# Patient Record
Sex: MALE | Race: WHITE | NOT HISPANIC OR LATINO | Employment: FULL TIME | URBAN - NONMETROPOLITAN AREA
[De-identification: names, ages, dates, MRNs, and addresses within clinical notes are randomized per-mention and may not be internally consistent; named-entity substitution may affect disease eponyms.]

---

## 2023-03-21 LAB
ALANINE AMINOTRANSFERASE (SGPT) (U/L) IN SER/PLAS: 19 U/L (ref 10–52)
ALBUMIN (G/DL) IN SER/PLAS: 4.1 G/DL (ref 3.4–5)
ALKALINE PHOSPHATASE (U/L) IN SER/PLAS: 61 U/L (ref 33–120)
ANION GAP IN SER/PLAS: 9 MMOL/L (ref 10–20)
ASPARTATE AMINOTRANSFERASE (SGOT) (U/L) IN SER/PLAS: 19 U/L (ref 9–39)
BILIRUBIN TOTAL (MG/DL) IN SER/PLAS: 0.7 MG/DL (ref 0–1.2)
CALCIUM (MG/DL) IN SER/PLAS: 9.2 MG/DL (ref 8.6–10.3)
CARBON DIOXIDE, TOTAL (MMOL/L) IN SER/PLAS: 31 MMOL/L (ref 21–32)
CHLORIDE (MMOL/L) IN SER/PLAS: 103 MMOL/L (ref 98–107)
CHOLESTEROL (MG/DL) IN SER/PLAS: 202 MG/DL (ref 0–199)
CHOLESTEROL IN HDL (MG/DL) IN SER/PLAS: 74 MG/DL
CHOLESTEROL/HDL RATIO: 2.7
CREATININE (MG/DL) IN SER/PLAS: 0.82 MG/DL (ref 0.5–1.3)
GFR MALE: >90 ML/MIN/1.73M2
GLUCOSE (MG/DL) IN SER/PLAS: 112 MG/DL (ref 74–99)
LDL: 105 MG/DL (ref 0–99)
POTASSIUM (MMOL/L) IN SER/PLAS: 3.9 MMOL/L (ref 3.5–5.3)
PROSTATE SPECIFIC ANTIGEN,SCREEN: 0.78 NG/ML (ref 0–4)
PROTEIN TOTAL: 7 G/DL (ref 6.4–8.2)
SODIUM (MMOL/L) IN SER/PLAS: 139 MMOL/L (ref 136–145)
TRIGLYCERIDE (MG/DL) IN SER/PLAS: 114 MG/DL (ref 0–149)
UREA NITROGEN (MG/DL) IN SER/PLAS: 12 MG/DL (ref 6–23)
VLDL: 23 MG/DL (ref 0–40)

## 2023-07-25 ENCOUNTER — APPOINTMENT (OUTPATIENT)
Dept: PRIMARY CARE | Facility: CLINIC | Age: 53
End: 2023-07-25
Payer: COMMERCIAL

## 2023-07-25 ENCOUNTER — TELEPHONE (OUTPATIENT)
Dept: PRIMARY CARE | Facility: CLINIC | Age: 53
End: 2023-07-25

## 2023-07-25 ENCOUNTER — OFFICE VISIT (OUTPATIENT)
Dept: PRIMARY CARE | Facility: CLINIC | Age: 53
End: 2023-07-25
Payer: COMMERCIAL

## 2023-07-25 VITALS
OXYGEN SATURATION: 96 % | DIASTOLIC BLOOD PRESSURE: 60 MMHG | WEIGHT: 217.3 LBS | HEIGHT: 73 IN | BODY MASS INDEX: 28.8 KG/M2 | SYSTOLIC BLOOD PRESSURE: 116 MMHG | HEART RATE: 90 BPM

## 2023-07-25 DIAGNOSIS — K21.9 GASTROESOPHAGEAL REFLUX DISEASE WITHOUT ESOPHAGITIS: Primary | ICD-10-CM

## 2023-07-25 DIAGNOSIS — Z01.818 PREOP EXAMINATION: ICD-10-CM

## 2023-07-25 DIAGNOSIS — G47.33 OBSTRUCTIVE SLEEP APNEA SYNDROME: ICD-10-CM

## 2023-07-25 PROCEDURE — 4004F PT TOBACCO SCREEN RCVD TLK: CPT | Performed by: FAMILY MEDICINE

## 2023-07-25 PROCEDURE — 99213 OFFICE O/P EST LOW 20 MIN: CPT | Performed by: FAMILY MEDICINE

## 2023-07-25 RX ORDER — ALBUTEROL SULFATE 90 UG/1
AEROSOL, METERED RESPIRATORY (INHALATION)
COMMUNITY
End: 2024-05-02

## 2023-07-25 RX ORDER — OMEPRAZOLE 20 MG/1
20 CAPSULE, DELAYED RELEASE ORAL
COMMUNITY

## 2023-07-25 ASSESSMENT — ENCOUNTER SYMPTOMS
LIGHT-HEADEDNESS: 0
TROUBLE SWALLOWING: 0
FATIGUE: 0
RHINORRHEA: 0
WHEEZING: 0
APPETITE CHANGE: 0
ADENOPATHY: 0
UNEXPECTED WEIGHT CHANGE: 0
ABDOMINAL PAIN: 0
NAUSEA: 0
DIZZINESS: 0
NUMBNESS: 0
SHORTNESS OF BREATH: 0
ACTIVITY CHANGE: 0
ABDOMINAL DISTENTION: 0
DIARRHEA: 0
ARTHRALGIAS: 0
VOMITING: 0
CONSTIPATION: 0
PALPITATIONS: 0
DIFFICULTY URINATING: 0
COUGH: 0
HEADACHES: 0

## 2023-07-25 NOTE — ASSESSMENT & PLAN NOTE
Patient has been using their CPAP nightly and is experiencing restful sleep, no morning headaches, no witnessed snoring, and notices a difference if they do not use the device.

## 2023-07-25 NOTE — TELEPHONE ENCOUNTER
----- Message from Rafa Santos MD sent at 7/25/2023  2:37 PM EDT -----  Regarding: Preop clearance  Please send a copy of today's office note to the patient's surgeon for preoperative clearance.

## 2023-07-25 NOTE — PROGRESS NOTES
"Subjective   Patient ID: Shiv Luciano is a 52 y.o. male who presents for Pre-op Clearance (Carpal tunnel).    HPI   No headache, chest pain, shortness of breath, dizziness, lightheadedness, or edema  To have CTS on left hand, to have surgery 8/10, to have blood testing and EKG tomorrow  Occ MDI use, wanting to quit smoking  Patient has been using their CPAP nightly and is experiencing restful sleep, no morning headaches, no witnessed snoring, and notices a difference if they do not use the device.      Review of Systems   Constitutional:  Negative for activity change, appetite change, fatigue and unexpected weight change.   HENT:  Negative for ear pain, nosebleeds, rhinorrhea, sneezing and trouble swallowing.    Respiratory:  Negative for cough, shortness of breath and wheezing.    Cardiovascular:  Negative for chest pain, palpitations and leg swelling.   Gastrointestinal:  Negative for abdominal distention, abdominal pain, constipation, diarrhea, nausea and vomiting.   Genitourinary:  Negative for difficulty urinating.   Musculoskeletal:  Negative for arthralgias.   Skin:  Negative for rash.   Neurological:  Negative for dizziness, light-headedness, numbness and headaches.   Hematological:  Negative for adenopathy.   Psychiatric/Behavioral:  Negative for behavioral problems.    All other systems reviewed and are negative.      Objective   /60   Pulse 90   Ht 1.854 m (6' 1\")   Wt 98.6 kg (217 lb 4.8 oz)   SpO2 96%   BMI 28.67 kg/m²     Physical Exam  Vitals and nursing note reviewed.   Constitutional:       Appearance: Normal appearance.   HENT:      Head: Normocephalic and atraumatic.      Right Ear: Tympanic membrane, ear canal and external ear normal.      Left Ear: Tympanic membrane, ear canal and external ear normal.      Nose: Nose normal.      Mouth/Throat:      Mouth: Mucous membranes are moist.      Pharynx: Oropharynx is clear.   Cardiovascular:      Rate and Rhythm: Normal rate and regular " rhythm.      Pulses: Normal pulses.      Heart sounds: Normal heart sounds.   Pulmonary:      Effort: Pulmonary effort is normal.      Breath sounds: Normal breath sounds.   Musculoskeletal:      Cervical back: Normal range of motion and neck supple.   Neurological:      Mental Status: He is alert.   Psychiatric:         Mood and Affect: Mood normal.         Behavior: Behavior normal.         Assessment/Plan   Problem List Items Addressed This Visit       Gastroesophageal reflux disease - Primary     Stable with current treatment.         Obstructive sleep apnea syndrome     Patient has been using their CPAP nightly and is experiencing restful sleep, no morning headaches, no witnessed snoring, and notices a difference if they do not use the device.            Other Visit Diagnoses       Preop examination        To have blood testing EKG done tomorrow at [facility], low risk for planned surgery for carpal tunnel, medically acceptable.

## 2023-09-29 ENCOUNTER — OFFICE VISIT (OUTPATIENT)
Dept: PRIMARY CARE | Facility: CLINIC | Age: 53
End: 2023-09-29
Payer: COMMERCIAL

## 2023-09-29 VITALS
HEART RATE: 92 BPM | HEIGHT: 73 IN | OXYGEN SATURATION: 96 % | BODY MASS INDEX: 29.29 KG/M2 | WEIGHT: 221 LBS | SYSTOLIC BLOOD PRESSURE: 120 MMHG | DIASTOLIC BLOOD PRESSURE: 80 MMHG

## 2023-09-29 DIAGNOSIS — K21.9 GASTROESOPHAGEAL REFLUX DISEASE WITHOUT ESOPHAGITIS: Primary | ICD-10-CM

## 2023-09-29 DIAGNOSIS — G47.33 OBSTRUCTIVE SLEEP APNEA SYNDROME: ICD-10-CM

## 2023-09-29 DIAGNOSIS — Z01.818 PREOP EXAMINATION: ICD-10-CM

## 2023-09-29 PROBLEM — G56.02 CARPAL TUNNEL SYNDROME OF LEFT WRIST: Status: ACTIVE | Noted: 2023-09-29

## 2023-09-29 PROCEDURE — 4004F PT TOBACCO SCREEN RCVD TLK: CPT | Performed by: FAMILY MEDICINE

## 2023-09-29 PROCEDURE — 99213 OFFICE O/P EST LOW 20 MIN: CPT | Performed by: FAMILY MEDICINE

## 2023-09-29 ASSESSMENT — ENCOUNTER SYMPTOMS
NUMBNESS: 0
DIZZINESS: 0
PALPITATIONS: 0
APPETITE CHANGE: 0
WHEEZING: 0
CONSTIPATION: 0
ACTIVITY CHANGE: 0
ADENOPATHY: 0
ABDOMINAL PAIN: 0
FATIGUE: 0
RHINORRHEA: 0
HEADACHES: 0
LIGHT-HEADEDNESS: 0
VOMITING: 0
UNEXPECTED WEIGHT CHANGE: 0
COUGH: 0
SHORTNESS OF BREATH: 0
DIARRHEA: 0
TROUBLE SWALLOWING: 0
NAUSEA: 0
DIFFICULTY URINATING: 0
ARTHRALGIAS: 0
ABDOMINAL DISTENTION: 0

## 2023-09-29 NOTE — PROGRESS NOTES
"Subjective   Patient ID: Shiv Luciano is a 52 y.o. male who presents for Pre-op Exam.    HPI   No headache, chest pain, shortness of breath, dizziness, lightheadedness, or edema  To have left CTR done 10/2, had done before and is having issues again  Able to exercise 4 mets  Smoking less than a pack a day  No GI issues, no issues with prior surgeries    Review of Systems   Constitutional:  Negative for activity change, appetite change, fatigue and unexpected weight change.   HENT:  Negative for ear pain, nosebleeds, rhinorrhea, sneezing and trouble swallowing.    Respiratory:  Negative for cough, shortness of breath and wheezing.    Cardiovascular:  Negative for chest pain, palpitations and leg swelling.   Gastrointestinal:  Negative for abdominal distention, abdominal pain, constipation, diarrhea, nausea and vomiting.   Genitourinary:  Negative for difficulty urinating.   Musculoskeletal:  Negative for arthralgias.   Skin:  Negative for rash.   Neurological:  Negative for dizziness, light-headedness, numbness and headaches.   Hematological:  Negative for adenopathy.   Psychiatric/Behavioral:  Negative for behavioral problems.    All other systems reviewed and are negative.      Objective   /80   Pulse 92   Ht 1.854 m (6' 1\")   Wt 100 kg (221 lb)   SpO2 96%   BMI 29.16 kg/m²     Physical Exam  Vitals and nursing note reviewed.   Constitutional:       Appearance: Normal appearance.   HENT:      Head: Normocephalic and atraumatic.      Right Ear: Tympanic membrane, ear canal and external ear normal.      Left Ear: Tympanic membrane, ear canal and external ear normal.      Nose: Nose normal.      Mouth/Throat:      Mouth: Mucous membranes are moist.      Pharynx: Oropharynx is clear.   Cardiovascular:      Rate and Rhythm: Normal rate and regular rhythm.      Pulses: Normal pulses.      Heart sounds: Normal heart sounds.   Pulmonary:      Effort: Pulmonary effort is normal.      Breath sounds: Normal breath " sounds.   Musculoskeletal:      Cervical back: Normal range of motion and neck supple.   Neurological:      Mental Status: He is alert.   Psychiatric:         Mood and Affect: Mood normal.         Behavior: Behavior normal.         Assessment/Plan   Problem List Items Addressed This Visit             ICD-10-CM    Gastroesophageal reflux disease - Primary K21.9     Stable with PPI no change.         Obstructive sleep apnea syndrome G47.33     Patient has been using their CPAP nightly and is experiencing restful sleep, no morning headaches, no witnessed snoring, and notices a difference if they do not use the device.            Other Visit Diagnoses         Codes    Preop examination     Z01.818    Medically acceptable for low risk surgery.

## 2023-11-22 ENCOUNTER — TELEPHONE (OUTPATIENT)
Dept: PRIMARY CARE | Facility: CLINIC | Age: 53
End: 2023-11-22
Payer: COMMERCIAL

## 2023-11-22 DIAGNOSIS — Z12.11 ENCOUNTER FOR SCREENING FOR MALIGNANT NEOPLASM OF COLON: ICD-10-CM

## 2023-12-08 LAB — NONINV COLON CA DNA+OCC BLD SCRN STL QL: NEGATIVE

## 2024-03-24 ENCOUNTER — TELEPHONE (OUTPATIENT)
Dept: PRIMARY CARE | Facility: CLINIC | Age: 54
End: 2024-03-24
Payer: COMMERCIAL

## 2024-03-24 NOTE — PROGRESS NOTES
Patient called after hours with an extreme sore throat, fevers, and chills, difficulty swallowing for about three days. Prescription for azithromycin sent to [facility] pharmacy for the patient, advise that over-the-counter medication, contact the office, if symptoms worsen.

## 2024-04-30 DIAGNOSIS — R05.9 COUGH, UNSPECIFIED: ICD-10-CM

## 2024-05-02 RX ORDER — ALBUTEROL SULFATE 90 UG/1
2 AEROSOL, METERED RESPIRATORY (INHALATION) EVERY 4 HOURS PRN
Qty: 54 G | Refills: 3 | Status: SHIPPED | OUTPATIENT
Start: 2024-05-02

## 2024-09-24 ENCOUNTER — OFFICE VISIT (OUTPATIENT)
Age: 54
End: 2024-09-24
Payer: COMMERCIAL

## 2024-09-24 VITALS
HEIGHT: 73 IN | WEIGHT: 233.6 LBS | SYSTOLIC BLOOD PRESSURE: 136 MMHG | HEART RATE: 98 BPM | OXYGEN SATURATION: 95 % | BODY MASS INDEX: 30.96 KG/M2 | DIASTOLIC BLOOD PRESSURE: 100 MMHG

## 2024-09-24 DIAGNOSIS — K64.4 EXTERNAL HEMORRHOID: Primary | ICD-10-CM

## 2024-09-24 PROCEDURE — 99213 OFFICE O/P EST LOW 20 MIN: CPT | Performed by: FAMILY MEDICINE

## 2024-09-24 PROCEDURE — 4004F PT TOBACCO SCREEN RCVD TLK: CPT | Performed by: FAMILY MEDICINE

## 2024-09-24 PROCEDURE — 3008F BODY MASS INDEX DOCD: CPT | Performed by: FAMILY MEDICINE

## 2024-09-24 ASSESSMENT — ENCOUNTER SYMPTOMS
ABDOMINAL PAIN: 0
CHEST TIGHTNESS: 0
NAUSEA: 0
COUGH: 0
ACTIVITY CHANGE: 0
FATIGUE: 0
SHORTNESS OF BREATH: 0
VOMITING: 0
CONSTIPATION: 0
DIARRHEA: 0
PALPITATIONS: 0
APPETITE CHANGE: 0
RECTAL PAIN: 1

## 2024-09-24 NOTE — PROGRESS NOTES
"Subjective   Patient ID: Shiv Luciano is a 53 y.o. male who presents for Hemorrhoids (Wart on Lt index finger).    HPI   Irritated hemeroid for 6 months, some leakage and seeping, some blood, had a rupture this past weekend, no pain, + soreness and itching, no constipation or straining      Review of Systems   Constitutional:  Negative for activity change, appetite change and fatigue.   Respiratory:  Negative for cough, chest tightness and shortness of breath.    Cardiovascular:  Negative for chest pain, palpitations and leg swelling.   Gastrointestinal:  Positive for rectal pain. Negative for abdominal pain, constipation, diarrhea, nausea and vomiting.       Objective   BP (!) 136/100   Pulse 98   Ht 1.854 m (6' 1\")   Wt 106 kg (233 lb 9.6 oz)   SpO2 95%   BMI 30.82 kg/m²     Physical Exam  Vitals and nursing note reviewed.   Constitutional:       Appearance: Normal appearance.   HENT:      Head: Normocephalic and atraumatic.      Right Ear: Tympanic membrane, ear canal and external ear normal.      Left Ear: Tympanic membrane, ear canal and external ear normal.      Nose: Nose normal.      Mouth/Throat:      Mouth: Mucous membranes are moist.      Pharynx: Oropharynx is clear.   Cardiovascular:      Rate and Rhythm: Normal rate and regular rhythm.      Pulses: Normal pulses.      Heart sounds: Normal heart sounds.   Pulmonary:      Effort: Pulmonary effort is normal.      Breath sounds: Normal breath sounds.   Genitourinary:     Comments: Rectal examination reveals a nonthrombosed external hemorrhoid.  Minimal erythema, no active bleeding.  Musculoskeletal:      Cervical back: Normal range of motion and neck supple.   Neurological:      Mental Status: He is alert.   Psychiatric:         Mood and Affect: Mood normal.         Behavior: Behavior normal.         Assessment/Plan   Problem List Items Addressed This Visit    None  Visit Diagnoses         Codes    External hemorrhoid    -  Primary K64.4    Recurrent " problem, advised about fiber, refer to general surgery for further evaluation.     Relevant Orders    Referral to General Surgery

## 2024-10-07 ENCOUNTER — APPOINTMENT (OUTPATIENT)
Dept: SURGERY | Facility: CLINIC | Age: 54
End: 2024-10-07
Payer: COMMERCIAL

## 2024-10-07 VITALS
SYSTOLIC BLOOD PRESSURE: 146 MMHG | BODY MASS INDEX: 31.57 KG/M2 | HEART RATE: 94 BPM | DIASTOLIC BLOOD PRESSURE: 86 MMHG | WEIGHT: 238.2 LBS | HEIGHT: 73 IN

## 2024-10-07 DIAGNOSIS — K64.4 EXTERNAL HEMORRHOID: ICD-10-CM

## 2024-10-07 PROCEDURE — 4004F PT TOBACCO SCREEN RCVD TLK: CPT | Performed by: SURGERY

## 2024-10-07 PROCEDURE — 3008F BODY MASS INDEX DOCD: CPT | Performed by: SURGERY

## 2024-10-07 PROCEDURE — 99203 OFFICE O/P NEW LOW 30 MIN: CPT | Performed by: SURGERY

## 2024-10-07 NOTE — LETTER
2024     Rafa Santos MD  [address] [address]    Patient: Shiv Luciano   YOB: 1970   Date of Visit: 10/7/2024       Dear Dr. Rafa Santos MD:    Thank you for referring Shiv Luciano to me for evaluation. Below are my notes for this consultation.  If you have questions, please do not hesitate to call me. I look forward to following your patient along with you.       Sincerely,     Cathie Tee MD      CC: No Recipients  ______________________________________________________________________________________    General Surgery Consultation    Patient: Shiv Luciano  : 1970  MRN: [id number]  Date of Consultation: 10/07/24    Referring Primary Care Provider: Rafa Santos MD    Chief Complaint: Perianal pain, itching    History of Present Illness: Shiv Luciano is a 53 y.o. old male seen at the request of Dr. Santos for evaluation of an external hemorrhoid.  He reports having an external hemorrhoid that flares up about once per year.  He reports that it swells to what feels like the size of the tip of his finger.  It subsequently drains blood clot and bright blood.  He uses wipes to help keep this area clean.  He has perianal itching associated with this.  He has bowel movements 2-3 times per day.  He reports that he is on the toilet for roughly 3 minutes with each bowel movement.  He denies straining.  He does not take any stool softeners, fiber supplements, or laxatives.  Aside from when he has these external hemorrhoid flareups, he denies seeing any blood per rectum. He has never had a colonoscopy.  He has no family history of colon or rectal cancer or inflammatory bowel disease.  He had a negative Cologuard test in 2023.    Medical History:  GERD  Sleep apnea, uses a CPAP at nighttime  BMI 30  Cervical disc disorder with radiculopathy    Surgical History:  Carpal tunnel release, left  Knee arthroscopy  Spinal fusion, 2019  Cervical  "disc replacement, 2022  Epidural steroid injection    Home Medications:  Prior to Admission medications    Medication Sig Start Date End Date Taking? Authorizing Provider   albuterol 90 mcg/actuation inhaler Inhale 2 puffs every 4 hours if needed for wheezing. 24   Rafa Santos MD   omeprazole (PriLOSEC) 20 mg DR capsule Take 1 capsule (20 mg) by mouth once daily.    Historical Provider, MD     Allergies:  Penicillins    Family History:   No family history of colon or rectal cancer or inflammatory bowel disease.  Mother with heart disease    Social History:  Smokes a pack of cigarettes daily, uses a nicotine patch.  Drinks beer weekly.  No drug use.    ROS:  Constitutional:  no fever, sweats, and chills  Cardiovascular: No chest pain  Respiratory: + Sleep apnea  Gastrointestinal: + Intermittent perianal swelling/pain/drainage of blood clot and bright red blood  Genitourinary: no dysuria  Musculoskeletal: no weakness or swelling  Integumentary: no rashes  Neurological: no confusion  Endocrine: no heat or cold intolerance  Heme/Lymph: no easy bruising or bleeding    Objective:  /86   Pulse 94   Ht 1.854 m (6' 1\")   Wt 108 kg (238 lb 3.2 oz)   BMI 31.43 kg/m²     Physical Exam:  Constitutional: No acute distress, conversant, pleasant  Neurologic: alert and oriented  Psych: appropriate affect  Ears, Nose, Mouth and Throat: mucus membranes moist  Pulmonary: No labored breathing  Cardiovascular: Regular rate and rhythm  Abdomen: Nondistended, BMI 31  Rectal: In the right posterior location he has 2 pinpoint scars consistent of the location where he reports having a swollen hemorrhoid.  He does not have any external hemorrhoidal skin tags.  He has increased tone on digital rectal exam and a deep paras cleft.  Musculoskeletal: Moves all extremities, no edema  Skin: no jaundice    Procedure:  With the assistance of my medical assistant, Hiro, in the prone jackknife position, I attempted to " perform lighted anoscopy.  However, the patient was unable to relax for this exam and the anus would not accommodate the scope.  Therefore, this procedure was aborted.    Labs/Imaging:   No recent/pertinent labs or imaging available for review.    Assessment and Plan: Shiv Luciano is a 53 y.o. old male with clinical history of what sounds like a thrombosed external hemorrhoid.  However, on examination he does not have any significant external hemorrhoidal disease.  He was unable to tolerate anoscopy in the office to evaluate further.  Given that he has never had a colonoscopy and has seen some blood per rectum, I recommended colonoscopy.  He was not interested in scheduling that at this time.  He is going to think about it and if interested in proceeding will notify my office.  We could sign consent the day of, but we would plan to do this in the operating room with the assistance of Anesthesiology given his history of sleep apnea. We discussed continuing to keep bowel movements soft and regular and avoiding straining.  If he were to have a recurrent thrombosed external hemorrhoid, we discussed benefit in having it incised and drained within 72 hours of presentation and he will notify my office. He will otherwise follow-up as needed.      Cathie Tee MD  10/7/2024

## 2024-10-07 NOTE — PROGRESS NOTES
General Surgery Consultation    Patient: Shiv Luciano  : 1970  MRN: [id number]  Date of Consultation: 10/07/24    Referring Primary Care Provider: Rafa Santos MD    Chief Complaint: Perianal pain, itching    History of Present Illness: Shiv Luciano is a 53 y.o. old male seen at the request of Dr. Santos for evaluation of an external hemorrhoid.  He reports having an external hemorrhoid that flares up about once per year.  He reports that it swells to what feels like the size of the tip of his finger.  It subsequently drains blood clot and bright blood.  He uses wipes to help keep this area clean.  He has perianal itching associated with this.  He has bowel movements 2-3 times per day.  He reports that he is on the toilet for roughly 3 minutes with each bowel movement.  He denies straining.  He does not take any stool softeners, fiber supplements, or laxatives.  Aside from when he has these external hemorrhoid flareups, he denies seeing any blood per rectum. He has never had a colonoscopy.  He has no family history of colon or rectal cancer or inflammatory bowel disease.  He had a negative Cologuard test in 2023.    Medical History:  GERD  Sleep apnea, uses a CPAP at nighttime  BMI 30  Cervical disc disorder with radiculopathy    Surgical History:  Carpal tunnel release, left  Knee arthroscopy  Spinal fusion, 2019  Cervical disc replacement, 2022  Epidural steroid injection    Home Medications:  Prior to Admission medications    Medication Sig Start Date End Date Taking? Authorizing Provider   albuterol 90 mcg/actuation inhaler Inhale 2 puffs every 4 hours if needed for wheezing. 24   Rafa Santos MD   omeprazole (PriLOSEC) 20 mg DR capsule Take 1 capsule (20 mg) by mouth once daily.    Historical Provider, MD     Allergies:  Penicillins    Family History:   No family history of colon or rectal cancer or inflammatory bowel disease.  Mother with heart disease    Social  "History:  Smokes a pack of cigarettes daily, uses a nicotine patch.  Drinks beer weekly.  No drug use.    ROS:  Constitutional:  no fever, sweats, and chills  Cardiovascular: No chest pain  Respiratory: + Sleep apnea  Gastrointestinal: + Intermittent perianal swelling/pain/drainage of blood clot and bright red blood  Genitourinary: no dysuria  Musculoskeletal: no weakness or swelling  Integumentary: no rashes  Neurological: no confusion  Endocrine: no heat or cold intolerance  Heme/Lymph: no easy bruising or bleeding    Objective:  /86   Pulse 94   Ht 1.854 m (6' 1\")   Wt 108 kg (238 lb 3.2 oz)   BMI 31.43 kg/m²     Physical Exam:  Constitutional: No acute distress, conversant, pleasant  Neurologic: alert and oriented  Psych: appropriate affect  Ears, Nose, Mouth and Throat: mucus membranes moist  Pulmonary: No labored breathing  Cardiovascular: Regular rate and rhythm  Abdomen: Nondistended, BMI 31  Rectal: In the right posterior location he has 2 pinpoint scars consistent of the location where he reports having a swollen hemorrhoid.  He does not have any external hemorrhoidal skin tags.  He has increased tone on digital rectal exam and a deep paras cleft.  Musculoskeletal: Moves all extremities, no edema  Skin: no jaundice    Procedure:  With the assistance of my medical assistant, Hiro, in the prone jackknife position, I attempted to perform lighted anoscopy.  However, the patient was unable to relax for this exam and the anus would not accommodate the scope.  Therefore, this procedure was aborted.    Labs/Imaging:   No recent/pertinent labs or imaging available for review.    Assessment and Plan: Shiv Luciano is a 53 y.o. old male with clinical history of what sounds like a thrombosed external hemorrhoid.  However, on examination he does not have any significant external hemorrhoidal disease.  He was unable to tolerate anoscopy in the office to evaluate further.  Given that he has never had a " colonoscopy and has seen some blood per rectum, I recommended colonoscopy.  He was not interested in scheduling that at this time.  He is going to think about it and if interested in proceeding will notify my office.  We could sign consent the day of, but we would plan to do this in the operating room with the assistance of Anesthesiology given his history of sleep apnea. We discussed continuing to keep bowel movements soft and regular and avoiding straining.  If he were to have a recurrent thrombosed external hemorrhoid, we discussed benefit in having it incised and drained within 72 hours of presentation and he will notify my office. He will otherwise follow-up as needed.      Cathie Tee MD  10/7/2024

## 2025-04-07 DIAGNOSIS — R05.9 COUGH, UNSPECIFIED: ICD-10-CM

## 2025-04-07 RX ORDER — ALBUTEROL SULFATE 90 UG/1
2 INHALANT RESPIRATORY (INHALATION) EVERY 4 HOURS PRN
Qty: 54 G | Refills: 3 | Status: SHIPPED | OUTPATIENT
Start: 2025-04-07

## 2025-05-13 ENCOUNTER — OFFICE VISIT (OUTPATIENT)
Age: 55
End: 2025-05-13
Payer: COMMERCIAL

## 2025-05-13 VITALS
HEIGHT: 73 IN | BODY MASS INDEX: 32.71 KG/M2 | OXYGEN SATURATION: 97 % | DIASTOLIC BLOOD PRESSURE: 90 MMHG | WEIGHT: 246.8 LBS | HEART RATE: 92 BPM | SYSTOLIC BLOOD PRESSURE: 130 MMHG

## 2025-05-13 DIAGNOSIS — R05.3 CHRONIC COUGH: ICD-10-CM

## 2025-05-13 DIAGNOSIS — Z87.891 PERSONAL HISTORY OF TOBACCO USE, PRESENTING HAZARDS TO HEALTH: Primary | ICD-10-CM

## 2025-05-13 DIAGNOSIS — R06.09 DYSPNEA ON EXERTION: ICD-10-CM

## 2025-05-13 PROCEDURE — 3008F BODY MASS INDEX DOCD: CPT | Performed by: FAMILY MEDICINE

## 2025-05-13 PROCEDURE — 1036F TOBACCO NON-USER: CPT | Performed by: FAMILY MEDICINE

## 2025-05-13 PROCEDURE — 99213 OFFICE O/P EST LOW 20 MIN: CPT | Performed by: FAMILY MEDICINE

## 2025-05-13 RX ORDER — PREDNISONE 10 MG/1
TABLET ORAL
Qty: 30 TABLET | Refills: 0 | Status: SHIPPED | OUTPATIENT
Start: 2025-05-13 | End: 2025-05-25

## 2025-05-13 RX ORDER — AZITHROMYCIN 250 MG/1
TABLET, FILM COATED ORAL
Qty: 6 TABLET | Refills: 0 | Status: SHIPPED | OUTPATIENT
Start: 2025-05-13 | End: 2025-05-18

## 2025-05-13 ASSESSMENT — ENCOUNTER SYMPTOMS
FATIGUE: 0
APPETITE CHANGE: 0
COUGH: 1
SHORTNESS OF BREATH: 0
ACTIVITY CHANGE: 0
DIARRHEA: 0
WHEEZING: 1
VOMITING: 0
NAUSEA: 0
CHEST TIGHTNESS: 0
PALPITATIONS: 0
CONSTIPATION: 0
ABDOMINAL PAIN: 0

## 2025-05-13 NOTE — PROGRESS NOTES
"Subjective   Patient ID: Shiv Luciano is a 54 y.o. male who presents for Wheezing.    HPI   Cough/congestion and wheezing for 6 months, quit smoking 12/1, (had quit 40 years/1 ppd), some THC use  Sputum thick and clear, worse in AM  Some WILKES at times  Uses albuterol frequently      Review of Systems   Constitutional:  Negative for activity change, appetite change and fatigue.   Respiratory:  Positive for cough and wheezing. Negative for chest tightness and shortness of breath.    Cardiovascular:  Negative for chest pain, palpitations and leg swelling.   Gastrointestinal:  Negative for abdominal pain, constipation, diarrhea, nausea and vomiting.       Objective   /90   Pulse 92   Ht 1.854 m (6' 1\")   Wt 112 kg (246 lb 12.8 oz)   SpO2 97%   BMI 32.56 kg/m²     Physical Exam  Vitals and nursing note reviewed.   Constitutional:       Appearance: Normal appearance.   HENT:      Head: Normocephalic and atraumatic.      Right Ear: Tympanic membrane, ear canal and external ear normal.      Left Ear: Tympanic membrane, ear canal and external ear normal.      Nose: Nose normal.      Mouth/Throat:      Mouth: Mucous membranes are moist.      Pharynx: Oropharynx is clear.   Cardiovascular:      Rate and Rhythm: Normal rate and regular rhythm.      Pulses: Normal pulses.      Heart sounds: Normal heart sounds.   Pulmonary:      Effort: Pulmonary effort is normal.      Breath sounds: Wheezing present.   Musculoskeletal:      Cervical back: Normal range of motion and neck supple.   Skin:     General: Skin is warm and dry.      Capillary Refill: Capillary refill takes less than 2 seconds.   Neurological:      Mental Status: He is alert.   Psychiatric:         Mood and Affect: Mood normal.         Behavior: Behavior normal.         Assessment/Plan   Problem List Items Addressed This Visit    None  Visit Diagnoses         Codes      Chronic cough    -  Primary R05.3    Check PFTs and CT chest, use prednisone and " azithromycin, consider further treatment for COPD depending on test results.     Relevant Medications    predniSONE (Deltasone) 10 mg tablet    azithromycin (Zithromax) 250 mg tablet    Other Relevant Orders    CT lung screening low dose    Complete Pulmonary Function Test Pre/Post Bronchodilator (Spirometry Pre/Post/DLCO/Lung Volumes)      Dyspnea on exertion     R06.09    Relevant Medications    predniSONE (Deltasone) 10 mg tablet    azithromycin (Zithromax) 250 mg tablet    Other Relevant Orders    CT lung screening low dose    Complete Pulmonary Function Test Pre/Post Bronchodilator (Spirometry Pre/Post/DLCO/Lung Volumes)

## 2025-05-15 ENCOUNTER — HOSPITAL ENCOUNTER (OUTPATIENT)
Dept: RESPIRATORY THERAPY | Facility: HOSPITAL | Age: 55
Discharge: HOME | End: 2025-05-15
Payer: COMMERCIAL

## 2025-05-15 DIAGNOSIS — R06.09 DYSPNEA ON EXERTION: ICD-10-CM

## 2025-05-15 DIAGNOSIS — R05.3 CHRONIC COUGH: ICD-10-CM

## 2025-05-15 PROCEDURE — 2500000001 HC RX 250 WO HCPCS SELF ADMINISTERED DRUGS (ALT 637 FOR MEDICARE OP): Performed by: FAMILY MEDICINE

## 2025-05-15 PROCEDURE — 94726 PLETHYSMOGRAPHY LUNG VOLUMES: CPT

## 2025-05-15 RX ORDER — ALBUTEROL SULFATE 90 UG/1
4 INHALANT RESPIRATORY (INHALATION) ONCE
Status: COMPLETED | OUTPATIENT
Start: 2025-05-15 | End: 2025-05-15

## 2025-05-15 RX ORDER — ALBUTEROL SULFATE 0.83 MG/ML
3 SOLUTION RESPIRATORY (INHALATION) ONCE
Status: COMPLETED | OUTPATIENT
Start: 2025-05-15 | End: 2025-05-15

## 2025-05-15 RX ADMIN — ALBUTEROL SULFATE 4 PUFF: 90 AEROSOL, METERED RESPIRATORY (INHALATION) at 08:40

## 2025-05-27 ENCOUNTER — HOSPITAL ENCOUNTER (OUTPATIENT)
Dept: RADIOLOGY | Facility: HOSPITAL | Age: 55
Discharge: HOME | End: 2025-05-27
Payer: COMMERCIAL

## 2025-05-27 DIAGNOSIS — Z87.891 PERSONAL HISTORY OF TOBACCO USE, PRESENTING HAZARDS TO HEALTH: ICD-10-CM

## 2025-05-27 DIAGNOSIS — R06.09 DYSPNEA ON EXERTION: ICD-10-CM

## 2025-05-27 DIAGNOSIS — R05.3 CHRONIC COUGH: ICD-10-CM

## 2025-05-27 PROCEDURE — 71271 CT THORAX LUNG CANCER SCR C-: CPT | Performed by: RADIOLOGY

## 2025-05-27 PROCEDURE — 71271 CT THORAX LUNG CANCER SCR C-: CPT

## 2025-05-30 DIAGNOSIS — J45.40 MODERATE PERSISTENT ASTHMA WITHOUT COMPLICATION (HHS-HCC): Primary | ICD-10-CM

## 2025-05-30 RX ORDER — FLUTICASONE PROPIONATE AND SALMETEROL 250; 50 UG/1; UG/1
1 POWDER RESPIRATORY (INHALATION)
Qty: 60 EACH | Refills: 11 | Status: SHIPPED | OUTPATIENT
Start: 2025-05-30 | End: 2026-05-30